# Patient Record
Sex: FEMALE | Race: OTHER | HISPANIC OR LATINO | URBAN - METROPOLITAN AREA
[De-identification: names, ages, dates, MRNs, and addresses within clinical notes are randomized per-mention and may not be internally consistent; named-entity substitution may affect disease eponyms.]

---

## 2017-07-26 ENCOUNTER — EMERGENCY (EMERGENCY)
Facility: HOSPITAL | Age: 42
LOS: 1 days | Discharge: HOME | End: 2017-07-26
Attending: STUDENT IN AN ORGANIZED HEALTH CARE EDUCATION/TRAINING PROGRAM | Admitting: STUDENT IN AN ORGANIZED HEALTH CARE EDUCATION/TRAINING PROGRAM
Payer: COMMERCIAL

## 2017-07-26 VITALS
SYSTOLIC BLOOD PRESSURE: 160 MMHG | WEIGHT: 149.91 LBS | HEIGHT: 60 IN | RESPIRATION RATE: 18 BRPM | HEART RATE: 61 BPM | DIASTOLIC BLOOD PRESSURE: 93 MMHG | TEMPERATURE: 98 F | OXYGEN SATURATION: 100 %

## 2017-07-26 PROCEDURE — 99283 EMERGENCY DEPT VISIT LOW MDM: CPT | Mod: 25

## 2017-07-27 LAB
APPEARANCE UR: ABNORMAL
BACTERIA # UR AUTO: ABNORMAL
BILIRUB UR-MCNC: NEGATIVE — SIGNIFICANT CHANGE UP
COLOR SPEC: YELLOW — SIGNIFICANT CHANGE UP
DIFF PNL FLD: ABNORMAL
EPI CELLS # UR: SIGNIFICANT CHANGE UP
GLUCOSE UR QL: NEGATIVE MG/DL — SIGNIFICANT CHANGE UP
KETONES UR-MCNC: NEGATIVE — SIGNIFICANT CHANGE UP
LEUKOCYTE ESTERASE UR-ACNC: ABNORMAL
NITRITE UR-MCNC: POSITIVE
PH UR: 6 — SIGNIFICANT CHANGE UP (ref 5–8)
PROT UR-MCNC: 30 MG/DL
RBC CASTS # UR COMP ASSIST: ABNORMAL /HPF (ref 0–4)
SP GR SPEC: 1.02 — SIGNIFICANT CHANGE UP (ref 1.01–1.02)
UROBILINOGEN FLD QL: 1 MG/DL
WBC UR QL: ABNORMAL

## 2017-07-27 PROCEDURE — 87086 URINE CULTURE/COLONY COUNT: CPT

## 2017-07-27 PROCEDURE — T1013: CPT

## 2017-07-27 PROCEDURE — 99283 EMERGENCY DEPT VISIT LOW MDM: CPT

## 2017-07-27 PROCEDURE — 81001 URINALYSIS AUTO W/SCOPE: CPT

## 2017-07-27 RX ORDER — CEPHALEXIN 500 MG
1 CAPSULE ORAL
Qty: 28 | Refills: 0 | OUTPATIENT
Start: 2017-07-27 | End: 2017-08-03

## 2017-07-27 RX ORDER — CEPHALEXIN 500 MG
500 CAPSULE ORAL EVERY 12 HOURS
Qty: 0 | Refills: 0 | Status: DISCONTINUED | OUTPATIENT
Start: 2017-07-27 | End: 2017-07-30

## 2017-07-27 RX ORDER — PHENAZOPYRIDINE HCL 100 MG
1 TABLET ORAL
Qty: 6 | Refills: 0 | OUTPATIENT
Start: 2017-07-27 | End: 2017-07-29

## 2017-07-27 RX ORDER — PHENAZOPYRIDINE HCL 100 MG
100 TABLET ORAL ONCE
Qty: 0 | Refills: 0 | Status: COMPLETED | OUTPATIENT
Start: 2017-07-27 | End: 2017-07-27

## 2017-07-27 RX ADMIN — Medication 500 MILLIGRAM(S): at 03:44

## 2017-07-27 RX ADMIN — Medication 100 MILLIGRAM(S): at 03:44

## 2017-07-27 NOTE — ED PROVIDER NOTE - ATTENDING CONTRIBUTION TO CARE
42 yo female with UTI symptoms. I personally saw the patient with the PA, and completed the key components of the history and physical exam. I then discussed the management plan with the PA.

## 2017-07-27 NOTE — ED PROVIDER NOTE - PROGRESS NOTE DETAILS
UA positive for UTI. Pt treated with Keflex in ED and D/C in stable condition. F/U with medical clinic as discussed.

## 2017-07-27 NOTE — ED PROVIDER NOTE - OBJECTIVE STATEMENT
41 yr old F presented to ED with 4 day hx of dysuria. Pt explained that she used the rest room and she experienced burning on urination followed by blood in her urine. Pt states that she has no blood in her urine since that first episode but she still have dysuria. Pt denies fever, chills, nausea or vomiting. Pt denies any other complaints at this time.

## 2017-07-30 LAB
CULTURE RESULTS: SIGNIFICANT CHANGE UP
SPECIMEN SOURCE: SIGNIFICANT CHANGE UP

## 2022-12-19 ENCOUNTER — EMERGENCY (EMERGENCY)
Facility: HOSPITAL | Age: 47
LOS: 1 days | Discharge: HOME | End: 2022-12-19
Attending: EMERGENCY MEDICINE
Payer: COMMERCIAL

## 2022-12-19 VITALS
SYSTOLIC BLOOD PRESSURE: 162 MMHG | HEIGHT: 62 IN | OXYGEN SATURATION: 99 % | RESPIRATION RATE: 18 BRPM | DIASTOLIC BLOOD PRESSURE: 95 MMHG | HEART RATE: 73 BPM | TEMPERATURE: 98 F | WEIGHT: 162.92 LBS

## 2022-12-19 PROCEDURE — 99284 EMERGENCY DEPT VISIT MOD MDM: CPT

## 2022-12-19 NOTE — ED ADULT TRIAGE NOTE - CHIEF COMPLAINT QUOTE
c/o of flu like symptoms, cough, bodyaches, difficulty breathing, hx of asthma. Pt does not have inhaler

## 2022-12-20 LAB
FLUAV AG NPH QL: DETECTED
FLUBV AG NPH QL: SIGNIFICANT CHANGE UP
RSV RNA NPH QL NAA+NON-PROBE: SIGNIFICANT CHANGE UP
SARS-COV-2 RNA SPEC QL NAA+PROBE: SIGNIFICANT CHANGE UP

## 2022-12-20 PROCEDURE — 99283 EMERGENCY DEPT VISIT LOW MDM: CPT

## 2022-12-20 PROCEDURE — 94640 AIRWAY INHALATION TREATMENT: CPT

## 2022-12-20 PROCEDURE — 87637 SARSCOV2&INF A&B&RSV AMP PRB: CPT

## 2022-12-20 RX ORDER — ALBUTEROL 90 UG/1
2 AEROSOL, METERED ORAL EVERY 6 HOURS
Refills: 0 | Status: DISCONTINUED | OUTPATIENT
Start: 2022-12-20 | End: 2022-12-27

## 2022-12-20 RX ADMIN — ALBUTEROL 2 PUFF(S): 90 AEROSOL, METERED ORAL at 01:15

## 2022-12-20 RX ADMIN — Medication 60 MILLIGRAM(S): at 01:14

## 2022-12-20 NOTE — ED PROVIDER NOTE - OBJECTIVE STATEMENT
46 yo female PMHx asthma presents to ED c/o viral sxms. Patient with cough, body aches, fatigue. Feels asthma is flaring up. +Sick contacts at work and home. Ran out of inhaler. Did not self medicate PTA. No further complaints at this time.  Denies fevers, hospitalizations/intubation.

## 2022-12-20 NOTE — ED PROVIDER NOTE - NS ED ATTENDING STATEMENT MOD
I have seen and examined this patient and fully participated in the care of this patient as the teaching attending.  The service was shared with the RITCHIE.  I reviewed and verified the documentation and independently performed the documented:

## 2022-12-20 NOTE — ED PROVIDER NOTE - CLINICAL SUMMARY MEDICAL DECISION MAKING FREE TEXT BOX
46 yo female PMHx asthma presents to ED c/o viral sxms. Feels like asthma flare, but lungs clear with exception of ?wheeze to RLL. Normal VS, very well appearing, benign exam. +Sick contacts. Check swab.

## 2022-12-20 NOTE — ED PROVIDER NOTE - ATTENDING CONTRIBUTION TO CARE
47yoF; with PMH signif for Asthma; now p/w URI--c/o generalized malaise, cough, bodyaches, fatigue. reports increasing wheezing since start of illness. +sick contacts.    General:     NAD  Head:     NC/AT, EOMI, oral mucosa moist  Neck:     trachea midline  Lungs:     bilateral air entry, occassional exp wheeze  CVS:     S1S2, RRR, no m/g/r  Ext:    salgado x4  Neuro: grossly intact  A/P: 47yoF p/w uri, well appearing, normal vital signs. will do swab, inhaler, and steroids to treat asthma exacerbation

## 2022-12-20 NOTE — ED PROVIDER NOTE - PATIENT PORTAL LINK FT
You can access the FollowMyHealth Patient Portal offered by [facility] by registering at the following website: [email or web address]. By joining [facility]’s FollowMyHealth portal, you will also be able to view your health information using other applications (apps) compatible with our system.

## 2022-12-20 NOTE — ED PROVIDER NOTE - NSFOLLOWUPINSTRUCTIONS_ED_ALL_ED_FT
- Prescription sent to pharmacy.  - Ibuprofen 600mg every 6 hours as needed for pain.  - Acetaminophen 650mg every 6 hours as needed for pain.  - Chirag's VapoRub.  - Please bring all documentation from your ED visit to any related future follow up appointment.  - Please call to schedule follow up appointment with your primary care physician within 24-48 hours.  - Please seek immediate medical attention or return to the ED for any new/worsening, signs/symptoms, or concerns.    Feel better!     - Receta enviada a farmacia.  - Ibuprofeno 600mg cada 6 horas según necesidad para el dolor.  - Acetaminofén 650 mg cada 6 horas según sea necesario para el dolor.  - VapoRub de Chirag.  - Traiga toda la documentación de dumont visita al ED a cualquier jenifer de seguimiento futura relacionada.  - Llame para programar alia jenifer de seguimiento con dumont médico de atención primaria dentro de las 24 a 48 horas.  - Busque atención médica inmediata o regrese al servicio de urgencias por cualquier signo/síntoma o inquietud nuevos/empeorados.    ¡Sentirse mejor!        Enfermedades virales en los adultos    Viral Illness, Adult      Los virus son microbios diminutos que entran en el organismo de alia persona y causan enfermedades. Hay muchos tipos de virus diferentes y causan muchas clases de enfermedades. Las enfermedades virales pueden ser leves o graves. Pueden afectar diferentes partes del cuerpo.    Entre las afecciones a corto plazo causadas por virus, se incluyen los resfríos y la gripe. Entre las afecciones a garfield plazo causadas por un virus, incluyen el herpes, la culebrilla y la infección por VIH (virus de inmunodeficiencia humana). Se singletary identificado unos pocos virus asociados con determinados tipos de cáncer.      ¿Cuáles son las causas?    Muchos tipos de virus pueden causar enfermedades. Los virus invaden las células del organismo, se multiplican y provocan que las células infectadas funcionen de manera anormal o mueran. Cuando estas células mueren, liberan más virus. Cuando esto ocurre, aparecen síntomas de la enfermedad, y el virus sigue diseminándose a otras células. Si el virus asume la función de la célula, puede hacer que esta se divida y prolifere de manera descontrolada. [place] ocurre cuando un virus causa cáncer.    Los diferentes virus ingresan al organismo de distintas formas. Puede contraer un virus de la siguiente manera:  •Al ingerir alimentos o beber agua que singletary entrado en contacto con el virus (están contaminados).      •Al inhalar gotitas que alia persona infectada liberó en el aire al toser o estornudar.      •Al tocar alia superficie contaminada con el virus y luego llevarse la mano a la boca, la nariz o los ojos.      •Al ser demetrius por un insecto o mordido por un animal que son portadores del virus.      •Al tener contacto sexual con alia persona infectada por el virus.      •Al tener contacto con adriana o líquidos que contienen el virus, ya sea a través de un charles abierto o gabriela alia transfusión.      Si el virus ingresa al organismo, el sistema de defensa del cuerpo (sistema inmunitario) intentará combatirlo. Puede correr un riesgo más alto de tener alia enfermedad viral si tiene el sistema inmunitario debilitado.      ¿Cuáles son los signos o síntomas?    Puede tener los siguientes síntomas, dependiendo del tipo de virus y de la ubicación de las células que invade:•Virus del resfrío y de la gripe:  •Fiebre.      •Dolor de christa.      •Dolor de garganta.      •Arminda musculares.      •Nariz tapada (congestión nasal).      •Tos.      •Virus del aparato digestivo (gastrointestinales):  •Fiebre.      •Dolor en el abdomen.      •Náuseas.      •Diarrea.      •Virus hepáticos (hepatitis):  •Pérdida del apetito.      •Cansancio.      •La piel o las partes jossy de los ojos se ponen dewayne (ictericia).      •Virus del cerebro y la médula ramos:  •Fiebre.      •Dolor de christa.      •Rigidez en el franco.      •Náuseas y vómitos.      •Confusión o somnolencia.      •Virus de la piel:  •Verrugas.      •Picazón.      •Erupción cutánea.      •Virus de transmisión sexual:  •Secreción.      •Hinchazón.      •Enrojecimiento.      •Erupción cutánea.          ¿Cómo se diagnostica?    Esta afección se puede diagnosticar en función de lo siguiente:  •Síntomas.      •Antecedentes médicos.      •Examen físico.      •Análisis de adriana, alia muestra de mucosidad de los pulmones (muestra de esputo), muestra de heces o un hisopado de líquidos corporales o alia llaga de la piel (lesión).        ¿Cómo se trata?    Los virus pueden ser difíciles de tratar porque se hospedan en las células. Los antibióticos no tratan los virus porque estos medicamentos no llegan al interior de las células. El tratamiento de alia enfermedad viral puede incluir lo siguiente:  •Descansar y beber abundantes líquidos.      •Medicamentos para aliviar los síntomas. Estos pueden incluir medicamentos de venta anabela para el dolor y la fiebre, medicamentos para la tos o la congestión y medicamentos para aliviar la diarrea.      •Medicamentos antivirales. Estos medicamentos están disponibles únicamente para ciertos tipos de virus.      Algunas enfermedades virales pueden evitarse con vacunas. Un ejemplo frecuente es la vacuna antigripal.      Siga estas instrucciones en dumont casa:    Medicamentos     •Use los medicamentos de venta anabela y los recetados solamente carmen se lo haya indicado el médico.      •Si le recetaron un medicamento antiviral, tómelo carmen se lo haya indicado el médico. No deje de jennifer el antiviral aunque comience a sentirse mejor.    •Infórmese sobre cuándo los antibióticos son necesarios y cuándo no. Los antibióticos no combaten a los virus. Si tiene alia infección viral y el médico katy que también tiene alia infección bacteriana, o que está en riesgo de contraerla, sandrita vez le recete un antibiótico.  •No solicite alia receta para antibióticos si le singletary diagnosticado alia enfermedad viral. Los antibióticos no harán que se cure más rápidamente.      •Jennifer antibióticos con frecuencia cuando no son necesarios puede derivar en resistencia a los antibióticos. Cuando esto ocurre, el medicamento pierde dumont eficacia contra las bacterias que normalmente combate.          Indicaciones generales      •Alem suficiente líquido para mantener la orina de color amarillo pálido.      •Descanse todo lo que pueda.      •Retome cristiane actividades normales según lo indicado por el médico. Pregúntele al médico qué actividades son seguras para usted.      •Concurra a todas las visitas de seguimiento carmen se lo haya indicado el médico. [place] es importante.        ¿Cómo se previene?     Para reducir el riesgo de tener alia enfermedad viral:  •Lávese las ynes frecuentemente con agua y jabón gabriela al menos 20 segundos. Use desinfectante para ynes si no dispone de agua y jabón.      •Evite tocarse la nariz, los ojos y la boca, sobre todo si no se lavó las yens recientemente.      •Si un miembro de la errol tiene alia infección viral, limpie todas las superficies de la casa que puedan pooja estado en contacto con el virus. Use [place] y jabón. También puede usar lejía con agua agregada (diluido).      •Manténgase lejos de las personas enfermas con síntomas de alia infección viral.      •No comparta objetos tales carmen cepillos de dientes y botellas de agua con otras personas.      •Mantenga las vacunas al día. [place] incluye recibir la vacuna antigripal todos los años.      •Siga alia dieta saludable y descanse lo suficiente.        Comuníquese con un médico si:    •Tiene síntomas de alia enfermedad viral que no desaparecen.      •Los síntomas regresan después de pooja desaparecido.      •Cristiane síntomas empeoran.        Solicite ayuda de inmediato si tiene:    •Dificultad para respirar.      •Dolor de christa intenso o rigidez en el franco.      •Vómitos abundantes o dolor en el abdomen.      Estos síntomas pueden representar un problema grave que constituye alia emergencia. No espere a juan si los síntomas desaparecen. Solicite atención médica de inmediato. Comuníquese con el servicio de emergencias de dumont localidad (911 en los [place]). No conduzca por cristiane propios medios hasta el hospital.       Resumen    •Los virus son tipos de microbios que entran en el organismo de alia persona y causan enfermedades. Las enfermedades virales pueden ser leves o graves. Pueden afectar diferentes partes del cuerpo.      •Los virus pueden ser difíciles de tratar. Hay medicamentos para aliviar los síntomas y hay algunos medicamentos antivirales.      •Si le recetaron un medicamento antiviral, tómelo carmen se lo haya indicado el médico. No deje de jennifer el antiviral aunque comience a sentirse mejor.      •Comuníquese con un médico si tiene síntomas de alia enfermedad viral que no desaparecen.      Esta información no tiene carmen fin reemplazar el consejo del médico. Asegúrese de hacerle al médico cualquier pregunta que tenga.

## 2023-03-17 ENCOUNTER — APPOINTMENT (OUTPATIENT)
Dept: OBGYN | Facility: CLINIC | Age: 48
End: 2023-03-17
Payer: MEDICAID

## 2023-03-17 VITALS
DIASTOLIC BLOOD PRESSURE: 76 MMHG | BODY MASS INDEX: 32.1 KG/M2 | SYSTOLIC BLOOD PRESSURE: 110 MMHG | WEIGHT: 163.5 LBS | HEIGHT: 60 IN

## 2023-03-17 DIAGNOSIS — Z12.31 ENCOUNTER FOR SCREENING MAMMOGRAM FOR MALIGNANT NEOPLASM OF BREAST: ICD-10-CM

## 2023-03-17 DIAGNOSIS — Z12.11 ENCOUNTER FOR SCREENING FOR MALIGNANT NEOPLASM OF COLON: ICD-10-CM

## 2023-03-17 DIAGNOSIS — Z01.419 ENCOUNTER FOR GYNECOLOGICAL EXAMINATION (GENERAL) (ROUTINE) W/OUT ABNORMAL FINDINGS: ICD-10-CM

## 2023-03-17 PROCEDURE — 99386 PREV VISIT NEW AGE 40-64: CPT

## 2023-03-17 PROCEDURE — 82270 OCCULT BLOOD FECES: CPT

## 2023-03-17 RX ORDER — ALBUTEROL
POWDER (GRAM) MISCELLANEOUS
Refills: 0 | Status: ACTIVE | COMMUNITY

## 2023-03-18 LAB
DATE COLLECTED: NORMAL
HEMOCCULT SP1 STL QL: NEGATIVE
QUALITY CONTROL: YES

## 2023-03-20 LAB — HPV HIGH+LOW RISK DNA PNL CVX: NOT DETECTED

## 2023-03-20 NOTE — DISCUSSION/SUMMARY
[FreeTextEntry1] : Pap done today. GUAIAC negative.\par \par Prescription for mammogram screening given, referred to a [facility] facility.\par \par Recommended colonoscopy screening consult, referred to [facility] specialist.\par \par Self-breast exam reviewed.\par \par She will follow up annually and as needed.

## 2023-03-20 NOTE — END OF VISIT
[FreeTextEntry3] : I, Aliya Watkins, solely acted as scribe for Dr. Ledy Rice on 03/17/23. All medical entries made by the Scribe were at my, Dr. Rice's, direction and personally dictated by me on 03/17/23. I have reviewed the chart and agree that the record accurately reflects my personal performance of the history, physical exam, assessment and plan. I have also personally directed, reviewed, and agreed with the chart.

## 2023-03-20 NOTE — PHYSICAL EXAM
[Appropriately responsive] : appropriately responsive [Alert] : alert [No Acute Distress] : no acute distress [Soft] : soft [Non-tender] : non-tender [Non-distended] : non-distended [Oriented x3] : oriented x3 [Examination Of The Breasts] : a normal appearance [No Discharge] : no discharge [No Masses] : no breast masses were palpable [Labia Majora] : normal [Labia Minora] : normal [No Bleeding] : There was no active vaginal bleeding [Normal] : normal [Uterine Adnexae] : normal [FreeTextEntry9] : Stool for occult blood.

## 2023-03-20 NOTE — HISTORY OF PRESENT ILLNESS
[N] : Patient does not use contraception [Y] : Positive pregnancy history [Menarche Age: ____] : age at menarche was [unfilled] [No] : Patient does not have concerns regarding sex [Currently Active] : currently active [Mammogramdate] : 2021 [PapSmeardate] : 2018 [LMPDate] : 10/2022 [PGxTotal] : 6 [[facility] [[facility] : 6 [FreeTextEntry1] : 10/2022

## 2023-03-30 LAB — CYTOLOGY CVX/VAG DOC THIN PREP: ABNORMAL

## 2023-04-07 ENCOUNTER — NON-APPOINTMENT (OUTPATIENT)
Age: 48
End: 2023-04-07

## 2024-01-29 NOTE — ED PROVIDER NOTE - PHYSICAL EXAMINATION
General: In NAD, non-toxic/well-appearing.  Skin: No rashes or lesions. Warm, dry, color normal for race.   Head: Normocephalic/atraumatic.   Eyes: Sclera anicteric, conjunctivae clear b/l. PERRLA, EOMI.   Neck: Supple, FROM.   Cardio: Rate and rhythm regular. No audible murmur.  Resp: Breath sounds vesicular, symmetrical and without rales, rhonchi or wheezing b/l with exception of ?occasional expiratory wheeze to RLL.   Abd: Soft, non-tender, no masses palpated. No rebound, guarding. No CVA tenderness.  MSK: MAEx4. FROM.   Neuro: A&Ox3.
Yes